# Patient Record
(demographics unavailable — no encounter records)

---

## 2017-12-05 NOTE — DIAGNOSTIC IMAGING REPORT
KUB



CLINICAL HISTORY: Nephrolithiasis. Right flank pain.



FINDINGS: 2 AP supine abdominal radiographs are compared to study dated

1/13/2017 and correlated with abdominal CT dated 1/24/2014. A 6 mm calculus

projects over the left renal pelvis. At least 4 additional small calculi project

over the left kidney. A punctate calculus is suggested projecting over the lower

pole the right kidney. There is no evidence of ureteral stone. Phleboliths are

again seen along the course of left gonadal vein and in the pelvis. No bowel

obstruction is identified. There is mild to moderate colonic fecal retention.

The skeletal structures are osteopenic. The bony structures appear intact.



IMPRESSION:



1. Numerous nonobstructive left renal calculi identified. A 6 mm calculus likely

projects over the left renal pelvis.



2. A single punctate nonobstructing calculus is suggested in the right kidney.

There is no convincing evidence of ureteral stone.







Electronically signed by:  Valeriy Michaels M.D.

12/5/2017 12:15 PM



Dictated Date/Time:  12/5/2017 12:11 PM

## 2017-12-07 NOTE — DIAGNOSTIC IMAGING REPORT
CT ABD/PELVIS COMBO



CLINICAL HISTORY: N20.0 [id number] hematuria     



COMPARISON STUDY:  1/24/2014



TECHNIQUE: Unenhanced images were obtained through the abdomen and pelvis. The

patient was injected with 50 cc of Optiray 320. After 5 minute delay, the

patient was rescanned in a dynamic helical fashion during the additional

administration of 70 cc of Optiray 320.  A dose lowering technique was utilized

adhering to the principles of ALARA.





CT DOSE: 1798.94 mGycm



FINDINGS:



Lower chest: There are mild basilar atelectatic changes



Liver: The contrast-enhanced liver is normal in size, contour, and attenuation.

There is no intrahepatic biliary ductal dilatation. The hepatic veins and portal

veins are patent.



Gallbladder: Unremarkable.



Spleen: Normal in size and attenuation.



Pancreas: Unremarkable.



Adrenal glands: Unremarkable.



Kidneys: At least 7 left renal calculi are visualized. The largest is located

within the upper pole measuring 7 mm. 2 right renal calculi are visualized. The

largest is located within the lower pole measuring 4 mm. No renal or ureteral

lesions are visualized.



Bowel: There are no transition zones indicate bowel obstruction. No acute

inflammatory changes are visualized.



Peritoneum: There is no intraperitoneal free air or abdominal ascites.



Vasculature: The abdominal aorta is normal in course and caliber.



Adenopathy: None.



Pelvic viscera: The bladder, and pelvic viscera are unremarkable.



Skeletal structures: No destructive osseous lesions are seen.



IMPRESSION:  

1. Bilateral nephrolithiasis

2. No ureteral or bladder calculi identified

3. No renal masses identified. No uroepithelial lesions identified







Electronically signed by:  Kain Villela M.D.

12/7/2017 9:20 AM



Dictated Date/Time:  12/7/2017 9:11 AM